# Patient Record
Sex: FEMALE | Race: WHITE | NOT HISPANIC OR LATINO
[De-identification: names, ages, dates, MRNs, and addresses within clinical notes are randomized per-mention and may not be internally consistent; named-entity substitution may affect disease eponyms.]

---

## 2020-08-31 ENCOUNTER — TRANSCRIPTION ENCOUNTER (OUTPATIENT)
Age: 55
End: 2020-08-31

## 2020-10-04 ENCOUNTER — TRANSCRIPTION ENCOUNTER (OUTPATIENT)
Age: 55
End: 2020-10-04

## 2021-07-28 ENCOUNTER — OUTPATIENT (OUTPATIENT)
Dept: OUTPATIENT SERVICES | Facility: HOSPITAL | Age: 56
LOS: 1 days | Discharge: HOME | End: 2021-07-28
Payer: COMMERCIAL

## 2021-07-28 VITALS
TEMPERATURE: 98 F | SYSTOLIC BLOOD PRESSURE: 98 MMHG | HEIGHT: 62.5 IN | DIASTOLIC BLOOD PRESSURE: 65 MMHG | HEART RATE: 67 BPM | WEIGHT: 200.4 LBS | RESPIRATION RATE: 17 BRPM | OXYGEN SATURATION: 97 %

## 2021-07-28 DIAGNOSIS — Z01.818 ENCOUNTER FOR OTHER PREPROCEDURAL EXAMINATION: ICD-10-CM

## 2021-07-28 DIAGNOSIS — Z98.84 BARIATRIC SURGERY STATUS: Chronic | ICD-10-CM

## 2021-07-28 DIAGNOSIS — M16.12 UNILATERAL PRIMARY OSTEOARTHRITIS, LEFT HIP: ICD-10-CM

## 2021-07-28 DIAGNOSIS — Z96.651 PRESENCE OF RIGHT ARTIFICIAL KNEE JOINT: Chronic | ICD-10-CM

## 2021-07-28 DIAGNOSIS — Z90.49 ACQUIRED ABSENCE OF OTHER SPECIFIED PARTS OF DIGESTIVE TRACT: Chronic | ICD-10-CM

## 2021-07-28 LAB
A1C WITH ESTIMATED AVERAGE GLUCOSE RESULT: 5.6 % — SIGNIFICANT CHANGE UP (ref 4–5.6)
ANION GAP SERPL CALC-SCNC: 5 MMOL/L — SIGNIFICANT CHANGE UP (ref 5–17)
APTT BLD: 34.9 SEC — SIGNIFICANT CHANGE UP (ref 27.5–35.5)
BLD GP AB SCN SERPL QL: SIGNIFICANT CHANGE UP
BUN SERPL-MCNC: 18 MG/DL — SIGNIFICANT CHANGE UP (ref 7–23)
CALCIUM SERPL-MCNC: 9 MG/DL — SIGNIFICANT CHANGE UP (ref 8.5–10.1)
CHLORIDE SERPL-SCNC: 105 MMOL/L — SIGNIFICANT CHANGE UP (ref 96–108)
CO2 SERPL-SCNC: 30 MMOL/L — SIGNIFICANT CHANGE UP (ref 22–31)
CREAT SERPL-MCNC: 0.77 MG/DL — SIGNIFICANT CHANGE UP (ref 0.5–1.3)
ESTIMATED AVERAGE GLUCOSE: 114 MG/DL — SIGNIFICANT CHANGE UP (ref 68–114)
GLUCOSE SERPL-MCNC: 88 MG/DL — SIGNIFICANT CHANGE UP (ref 70–99)
HCG UR QL: NEGATIVE — SIGNIFICANT CHANGE UP
HCT VFR BLD CALC: 41.8 % — SIGNIFICANT CHANGE UP (ref 34.5–45)
HGB BLD-MCNC: 13.5 G/DL — SIGNIFICANT CHANGE UP (ref 11.5–15.5)
INR BLD: 1.1 RATIO — SIGNIFICANT CHANGE UP (ref 0.88–1.16)
MCHC RBC-ENTMCNC: 28.5 PG — SIGNIFICANT CHANGE UP (ref 27–34)
MCHC RBC-ENTMCNC: 32.3 GM/DL — SIGNIFICANT CHANGE UP (ref 32–36)
MCV RBC AUTO: 88.2 FL — SIGNIFICANT CHANGE UP (ref 80–100)
MRSA PCR RESULT.: SIGNIFICANT CHANGE UP
NRBC # BLD: 0 /100 WBCS — SIGNIFICANT CHANGE UP (ref 0–0)
PLATELET # BLD AUTO: 246 K/UL — SIGNIFICANT CHANGE UP (ref 150–400)
POTASSIUM SERPL-MCNC: 3.5 MMOL/L — SIGNIFICANT CHANGE UP (ref 3.5–5.3)
POTASSIUM SERPL-SCNC: 3.5 MMOL/L — SIGNIFICANT CHANGE UP (ref 3.5–5.3)
PROTHROM AB SERPL-ACNC: 12.7 SEC — SIGNIFICANT CHANGE UP (ref 10.6–13.6)
RBC # BLD: 4.74 M/UL — SIGNIFICANT CHANGE UP (ref 3.8–5.2)
RBC # FLD: 13.3 % — SIGNIFICANT CHANGE UP (ref 10.3–14.5)
S AUREUS DNA NOSE QL NAA+PROBE: SIGNIFICANT CHANGE UP
SODIUM SERPL-SCNC: 140 MMOL/L — SIGNIFICANT CHANGE UP (ref 135–145)
WBC # BLD: 5.71 K/UL — SIGNIFICANT CHANGE UP (ref 3.8–10.5)
WBC # FLD AUTO: 5.71 K/UL — SIGNIFICANT CHANGE UP (ref 3.8–10.5)

## 2021-07-28 PROCEDURE — 93010 ELECTROCARDIOGRAM REPORT: CPT

## 2021-07-28 RX ORDER — AMLODIPINE BESYLATE 2.5 MG/1
1 TABLET ORAL
Qty: 0 | Refills: 0 | DISCHARGE

## 2021-07-28 NOTE — OCCUPATIONAL THERAPY INITIAL EVALUATION ADULT - ADDITIONAL COMMENTS
Pt lives alone in a condo (family/friends able to assist post-operatively) c elevator to enter. Once inside, pt's main bedroom/bathroom is on one level. Pt's bathroom is equipped c a tub/shower-combo, fixed shower head, and standard height toilet seat. Pt reports there is adequate space over toilet to fit 3:1 commode. Pt is right hand dominant, wears glasses for reading and currently drives. Pt reports daily pain at rest is 4-5/10 and increases to 10+/10 c ambulation and stair climbing. Pt alleviates pain c elevation, heat, Ibuprofen and celebrex PRN. Pt had gastric sleeve surgery May 2021 and reported precautions c NSAIDS at this time.

## 2021-07-28 NOTE — OCCUPATIONAL THERAPY INITIAL EVALUATION ADULT - GENERAL OBSERVATIONS, REHAB EVAL
Pt is a 56 y/o female slated for elective surgery for left PARISH with MD Lal on 8/11/21 due to OA and chronic pain. Pt denied any falls in the past 3-6 months. Pt reported buckling of LLE 3x/week.

## 2021-07-28 NOTE — H&P PST ADULT - ASSESSMENT
55 year old female with a past medical history of HTN and GERD c/o severe pain and limited ROM to left hip secondary to osteoarthritis  She is scheduled for a left total hip replacement.      CAPRINI SCORE [CLOT]    AGE RELATED RISK FACTORS                                                       MOBILITY RELATED FACTORS  [x] Age 41-60 years                                            (1 Point)                  [ ] Bed rest                                                        (1 Point)  [ ] Age: 61-74 years                                           (2 Points)                 [ ] Plaster cast                                                   (2 Points)  [ ] Age= 75 years                                              (3 Points)                 [ ] Bed bound for more than 72 hours                 (2 Points)    DISEASE RELATED RISK FACTORS                                               GENDER SPECIFIC FACTORS  [ ] Edema in the lower extremities                       (1 Point)                  [ ] Pregnancy                                                     (1 Point)  [ ] Varicose veins                                               (1 Point)                  [ ] Post-partum < 6 weeks                                   (1 Point)             [ x] BMI > 25 Kg/m2                                            (1 Point)                  [ ] Hormonal therapy  or oral contraception          (1 Point)                 [ ] Sepsis (in the previous month)                        (1 Point)                  [ ] History of pregnancy complications                 (1 point)  [ ] Pneumonia or serious lung disease                                               [ ] Unexplained or recurrent                     (1 Point)           (in the previous month)                               (1 Point)  [ ] Abnormal pulmonary function test                     (1 Point)                 SURGERY RELATED RISK FACTORS  [ ] Acute myocardial infarction                              (1 Point)                 [ ]  Section                                             (1 Point)  [ ] Congestive heart failure (in the previous month)  (1 Point)               [ ] Minor surgery                                                  (1 Point)   [ ] Inflammatory bowel disease                             (1 Point)                 [ ] Arthroscopic surgery                                        (2 Points)  [ ] Central venous access                                      (2 Points)                [ ] General surgery lasting more than 45 minutes   (2 Points)       [ ] Stroke (in the previous month)                          (5 Points)               [ x] Elective arthroplasty                                         (5 Points)                                                                                                                                               HEMATOLOGY RELATED FACTORS                                                 TRAUMA RELATED RISK FACTORS  [ ] Prior episodes of VTE                                     (3 Points)                [ ] Fracture of the hip, pelvis, or leg                       (5 Points)  [ ] Positive family history for VTE                         (3 Points)                 [ ] Acute spinal cord injury (in the previous month)  (5 Points)  [ ] Prothrombin 81123 A                                     (3 Points)                 [ ] Paralysis  (less than 1 month)                             (5 Points)  [ ] Factor V Leiden                                             (3 Points)                  [ ] Multiple Trauma within 1 month                        (5 Points)  [ ] Lupus anticoagulants                                     (3 Points)                                                           [ ] Anticardiolipin antibodies                               (3 Points)                                                       [ ] High homocysteine in the blood                      (3 Points)                                             [ ] Other congenital or acquired thrombophilia      (3 Points)                                                [ ] Heparin induced thrombocytopenia                  (3 Points)                                          Total Score [ 7         ]    Caprini Score 0 - 2:  Low Risk, No VTE Prophylaxis required for most patients, encourage ambulation  Caprini Score 3 - 6:  At Risk, pharmacologic VTE prophylaxis is indicated for most patients (in the absence of a contraindication)  Caprini Score Greater than or = 7:  High Risk, pharmacologic VTE prophylaxis is indicated for most patients (in the absence of a contraindication)    Caprini score indicates that the patient is high risk for VTE event ( score 6 or greater). Surgical patient's in this group will benefit from both pharmacologic prophylaxis and intermittent compression devices . Surgical team will determine the balance between VTE  risk and bleeding risk and other clinical considerations

## 2021-07-28 NOTE — PHYSICAL THERAPY INITIAL EVALUATION ADULT - ADDITIONAL COMMENTS
Pt reporting that she lives in a condo, no steps to negotiate outside or inside. Bathroom is a tub shower, no grab bars, fixed shower head, standard height toilet. Pt reporting that a 3:1 commode can fit over toilet. Pt has a cane, used prn, no other dme. Pain is 4/10 at rest and can increase to 10/10 with activity. Pt uses heat abd elevation for pain relief, meds like advil and celebrex. Pt reports that she has had adverse reaction to NSAIDs, no recent PT, no recent falls but experiences buckling 2-3 x week. Pt wears reading glasses, is R hand dominant, drives, no hearing impairments. Pt currently working as a teacher.

## 2021-07-28 NOTE — PHYSICAL THERAPY INITIAL EVALUATION ADULT - SINGLE LEG BALANCE TEST, REHAB EVAL
One Leg Stand Test (OLST): Right: 10 seconds Left: 0 seconds. Functional test to assess fall risk. Both gait and stair negotiation require components of OLST. Participants unable to perform the OLST for at least 5 seconds are at increased risk for injurious fall.

## 2021-07-28 NOTE — H&P PST ADULT - HISTORY OF PRESENT ILLNESS
55 year old female with a past medical history of HTN and GERD c/o severe pain and limited ROM to left hip secondary to osteoarthritis  She is scheduled for a left total hip replacement.      She denies fever, cough, SOB, recent travels, and sick contacts.    Goal: To exercise and walk without pain.

## 2021-07-28 NOTE — H&P PST ADULT - NSANTHOSAYNRD_GEN_A_CORE
no longer use since weight loss/No. VERNA screening performed.  STOP BANG Legend: 0-2 = LOW Risk; 3-4 = INTERMEDIATE Risk; 5-8 = HIGH Risk

## 2021-07-28 NOTE — PHYSICAL THERAPY INITIAL EVALUATION ADULT - MODIFIED CLINICAL TEST OF SENSORY INTEGRATION IN BALANCE TEST
30 second Sit to Stand Test: (reps: 7 adaptation: B UE used) Functional assessment of lower extremity strength and ability to maintain balance in standing, discriminates those with low and high levels of functional activity.

## 2021-07-28 NOTE — H&P PST ADULT - NSICDXPROBLEM_GEN_ALL_CORE_FT
PROBLEM DIAGNOSES  Problem: Unilateral primary osteoarthritis, left hip  Assessment and Plan: left total hiip replacement     Problem: Preop examination  Assessment and Plan: labs - cbc,pt/ptt,bmp,t&s,nose cx,ekg  M/C required  preop 3 day hibiclens instruction reviewed and given .instructed on if  nose cx positive use mupuricin 5 days and checklist given  take routine meds DOS with sips of water. avoid NSAID and OTC supplements. verbalized understanding  information on proper nutrition , increase protein and better food choices provided in packet

## 2021-07-28 NOTE — H&P PST ADULT - NSICDXPASTSURGICALHX_GEN_ALL_CORE_FT
PAST SURGICAL HISTORY:  H/O bariatric surgery     H/O total knee replacement, right     History of appendectomy

## 2021-08-08 RX ORDER — ACETAMINOPHEN 500 MG
1000 TABLET ORAL ONCE
Refills: 0 | Status: DISCONTINUED | OUTPATIENT
Start: 2021-08-11 | End: 2021-08-12

## 2021-08-08 RX ORDER — OXYCODONE HYDROCHLORIDE 5 MG/1
10 TABLET ORAL EVERY 4 HOURS
Refills: 0 | Status: DISCONTINUED | OUTPATIENT
Start: 2021-08-11 | End: 2021-08-12

## 2021-08-08 RX ORDER — AMLODIPINE BESYLATE 2.5 MG/1
10 TABLET ORAL DAILY
Refills: 0 | Status: DISCONTINUED | OUTPATIENT
Start: 2021-08-11 | End: 2021-08-12

## 2021-08-08 RX ORDER — CELECOXIB 200 MG/1
200 CAPSULE ORAL EVERY 12 HOURS
Refills: 0 | Status: DISCONTINUED | OUTPATIENT
Start: 2021-08-12 | End: 2021-08-12

## 2021-08-08 RX ORDER — ONDANSETRON 8 MG/1
4 TABLET, FILM COATED ORAL EVERY 6 HOURS
Refills: 0 | Status: DISCONTINUED | OUTPATIENT
Start: 2021-08-11 | End: 2021-08-12

## 2021-08-08 RX ORDER — POLYETHYLENE GLYCOL 3350 17 G/17G
17 POWDER, FOR SOLUTION ORAL AT BEDTIME
Refills: 0 | Status: DISCONTINUED | OUTPATIENT
Start: 2021-08-11 | End: 2021-08-12

## 2021-08-08 RX ORDER — PANTOPRAZOLE SODIUM 20 MG/1
40 TABLET, DELAYED RELEASE ORAL
Refills: 0 | Status: DISCONTINUED | OUTPATIENT
Start: 2021-08-11 | End: 2021-08-12

## 2021-08-08 RX ORDER — DEXAMETHASONE 0.5 MG/5ML
10 ELIXIR ORAL ONCE
Refills: 0 | Status: COMPLETED | OUTPATIENT
Start: 2021-08-12 | End: 2021-08-12

## 2021-08-08 RX ORDER — OXYCODONE HYDROCHLORIDE 5 MG/1
5 TABLET ORAL EVERY 4 HOURS
Refills: 0 | Status: DISCONTINUED | OUTPATIENT
Start: 2021-08-11 | End: 2021-08-12

## 2021-08-08 RX ORDER — ASCORBIC ACID 60 MG
500 TABLET,CHEWABLE ORAL
Refills: 0 | Status: DISCONTINUED | OUTPATIENT
Start: 2021-08-11 | End: 2021-08-12

## 2021-08-08 RX ORDER — HYDROCHLOROTHIAZIDE 25 MG
12.5 TABLET ORAL DAILY
Refills: 0 | Status: DISCONTINUED | OUTPATIENT
Start: 2021-08-11 | End: 2021-08-12

## 2021-08-08 RX ORDER — ASPIRIN/CALCIUM CARB/MAGNESIUM 324 MG
81 TABLET ORAL
Refills: 0 | Status: DISCONTINUED | OUTPATIENT
Start: 2021-08-12 | End: 2021-08-12

## 2021-08-08 RX ORDER — LISINOPRIL 2.5 MG/1
40 TABLET ORAL DAILY
Refills: 0 | Status: DISCONTINUED | OUTPATIENT
Start: 2021-08-11 | End: 2021-08-12

## 2021-08-08 RX ORDER — SENNA PLUS 8.6 MG/1
2 TABLET ORAL AT BEDTIME
Refills: 0 | Status: DISCONTINUED | OUTPATIENT
Start: 2021-08-11 | End: 2021-08-12

## 2021-08-08 RX ORDER — SODIUM CHLORIDE 9 MG/ML
1000 INJECTION, SOLUTION INTRAVENOUS
Refills: 0 | Status: DISCONTINUED | OUTPATIENT
Start: 2021-08-11 | End: 2021-08-12

## 2021-08-10 ENCOUNTER — TRANSCRIPTION ENCOUNTER (OUTPATIENT)
Age: 56
End: 2021-08-10

## 2021-08-10 RX ORDER — HYDROMORPHONE HYDROCHLORIDE 2 MG/ML
0.5 INJECTION INTRAMUSCULAR; INTRAVENOUS; SUBCUTANEOUS
Refills: 0 | Status: DISCONTINUED | OUTPATIENT
Start: 2021-08-11 | End: 2021-08-12

## 2021-08-11 ENCOUNTER — INPATIENT (INPATIENT)
Facility: HOSPITAL | Age: 56
LOS: 0 days | Discharge: HOME HEALTH | End: 2021-08-12
Attending: ORTHOPAEDIC SURGERY | Admitting: ORTHOPAEDIC SURGERY
Payer: COMMERCIAL

## 2021-08-11 ENCOUNTER — RESULT REVIEW (OUTPATIENT)
Age: 56
End: 2021-08-11

## 2021-08-11 ENCOUNTER — TRANSCRIPTION ENCOUNTER (OUTPATIENT)
Age: 56
End: 2021-08-11

## 2021-08-11 VITALS
DIASTOLIC BLOOD PRESSURE: 84 MMHG | RESPIRATION RATE: 18 BRPM | HEART RATE: 73 BPM | OXYGEN SATURATION: 98 % | TEMPERATURE: 99 F | HEIGHT: 62 IN | WEIGHT: 197.98 LBS | SYSTOLIC BLOOD PRESSURE: 132 MMHG

## 2021-08-11 DIAGNOSIS — Z96.651 PRESENCE OF RIGHT ARTIFICIAL KNEE JOINT: Chronic | ICD-10-CM

## 2021-08-11 DIAGNOSIS — Z90.49 ACQUIRED ABSENCE OF OTHER SPECIFIED PARTS OF DIGESTIVE TRACT: Chronic | ICD-10-CM

## 2021-08-11 DIAGNOSIS — Z98.84 BARIATRIC SURGERY STATUS: Chronic | ICD-10-CM

## 2021-08-11 LAB
BLD GP AB SCN SERPL QL: SIGNIFICANT CHANGE UP
HCG UR QL: NEGATIVE — SIGNIFICANT CHANGE UP
HCT VFR BLD CALC: 37.5 % — SIGNIFICANT CHANGE UP (ref 34.5–45)
HGB BLD-MCNC: 11.8 G/DL — SIGNIFICANT CHANGE UP (ref 11.5–15.5)
MCHC RBC-ENTMCNC: 28.4 PG — SIGNIFICANT CHANGE UP (ref 27–34)
MCHC RBC-ENTMCNC: 31.5 GM/DL — LOW (ref 32–36)
MCV RBC AUTO: 90.4 FL — SIGNIFICANT CHANGE UP (ref 80–100)
NRBC # BLD: 0 /100 WBCS — SIGNIFICANT CHANGE UP (ref 0–0)
PLATELET # BLD AUTO: 164 K/UL — SIGNIFICANT CHANGE UP (ref 150–400)
RBC # BLD: 4.15 M/UL — SIGNIFICANT CHANGE UP (ref 3.8–5.2)
RBC # FLD: 13.8 % — SIGNIFICANT CHANGE UP (ref 10.3–14.5)
WBC # BLD: 9.66 K/UL — SIGNIFICANT CHANGE UP (ref 3.8–10.5)
WBC # FLD AUTO: 9.66 K/UL — SIGNIFICANT CHANGE UP (ref 3.8–10.5)

## 2021-08-11 PROCEDURE — 72170 X-RAY EXAM OF PELVIS: CPT | Mod: 26

## 2021-08-11 PROCEDURE — 88311 DECALCIFY TISSUE: CPT | Mod: 26

## 2021-08-11 PROCEDURE — 88305 TISSUE EXAM BY PATHOLOGIST: CPT | Mod: 26

## 2021-08-11 RX ORDER — SODIUM CHLORIDE 9 MG/ML
1000 INJECTION, SOLUTION INTRAVENOUS
Refills: 0 | Status: DISCONTINUED | OUTPATIENT
Start: 2021-08-11 | End: 2021-08-11

## 2021-08-11 RX ORDER — SODIUM CHLORIDE 9 MG/ML
3 INJECTION INTRAMUSCULAR; INTRAVENOUS; SUBCUTANEOUS EVERY 8 HOURS
Refills: 0 | Status: DISCONTINUED | OUTPATIENT
Start: 2021-08-11 | End: 2021-08-11

## 2021-08-11 RX ORDER — OMEPRAZOLE 10 MG/1
1 CAPSULE, DELAYED RELEASE ORAL
Qty: 0 | Refills: 0 | DISCHARGE

## 2021-08-11 RX ORDER — AMLODIPINE BESYLATE AND BENAZEPRIL HYDROCHLORIDE 10; 20 MG/1; MG/1
1 CAPSULE ORAL
Qty: 0 | Refills: 0 | DISCHARGE

## 2021-08-11 RX ORDER — BENZOCAINE AND MENTHOL 5; 1 G/100ML; G/100ML
1 LIQUID ORAL THREE TIMES A DAY
Refills: 0 | Status: DISCONTINUED | OUTPATIENT
Start: 2021-08-11 | End: 2021-08-12

## 2021-08-11 RX ORDER — FENTANYL CITRATE 50 UG/ML
25 INJECTION INTRAVENOUS
Refills: 0 | Status: DISCONTINUED | OUTPATIENT
Start: 2021-08-11 | End: 2021-08-11

## 2021-08-11 RX ORDER — METOCLOPRAMIDE HCL 10 MG
10 TABLET ORAL ONCE
Refills: 0 | Status: DISCONTINUED | OUTPATIENT
Start: 2021-08-11 | End: 2021-08-11

## 2021-08-11 RX ORDER — LANOLIN ALCOHOL/MO/W.PET/CERES
3 CREAM (GRAM) TOPICAL AT BEDTIME
Refills: 0 | Status: DISCONTINUED | OUTPATIENT
Start: 2021-08-11 | End: 2021-08-12

## 2021-08-11 RX ORDER — FENTANYL CITRATE 50 UG/ML
50 INJECTION INTRAVENOUS
Refills: 0 | Status: DISCONTINUED | OUTPATIENT
Start: 2021-08-11 | End: 2021-08-11

## 2021-08-11 RX ORDER — ACETAMINOPHEN 500 MG
650 TABLET ORAL ONCE
Refills: 0 | Status: COMPLETED | OUTPATIENT
Start: 2021-08-11 | End: 2021-08-11

## 2021-08-11 RX ORDER — CEFAZOLIN SODIUM 1 G
2000 VIAL (EA) INJECTION EVERY 8 HOURS
Refills: 0 | Status: COMPLETED | OUTPATIENT
Start: 2021-08-11 | End: 2021-08-11

## 2021-08-11 RX ADMIN — OXYCODONE HYDROCHLORIDE 10 MILLIGRAM(S): 5 TABLET ORAL at 12:58

## 2021-08-11 RX ADMIN — Medication 3 MILLIGRAM(S): at 22:05

## 2021-08-11 RX ADMIN — OXYCODONE HYDROCHLORIDE 10 MILLIGRAM(S): 5 TABLET ORAL at 18:57

## 2021-08-11 RX ADMIN — Medication 100 MILLIGRAM(S): at 23:05

## 2021-08-11 RX ADMIN — SODIUM CHLORIDE 125 MILLILITER(S): 9 INJECTION, SOLUTION INTRAVENOUS at 10:56

## 2021-08-11 RX ADMIN — OXYCODONE HYDROCHLORIDE 10 MILLIGRAM(S): 5 TABLET ORAL at 13:50

## 2021-08-11 RX ADMIN — HYDROMORPHONE HYDROCHLORIDE 0.5 MILLIGRAM(S): 2 INJECTION INTRAMUSCULAR; INTRAVENOUS; SUBCUTANEOUS at 16:44

## 2021-08-11 RX ADMIN — SODIUM CHLORIDE 150 MILLILITER(S): 9 INJECTION, SOLUTION INTRAVENOUS at 12:58

## 2021-08-11 RX ADMIN — Medication 500 MILLIGRAM(S): at 18:05

## 2021-08-11 RX ADMIN — SODIUM CHLORIDE 150 MILLILITER(S): 9 INJECTION, SOLUTION INTRAVENOUS at 22:05

## 2021-08-11 RX ADMIN — Medication 1 TABLET(S): at 12:58

## 2021-08-11 RX ADMIN — OXYCODONE HYDROCHLORIDE 10 MILLIGRAM(S): 5 TABLET ORAL at 23:08

## 2021-08-11 RX ADMIN — Medication 100 MILLIGRAM(S): at 16:18

## 2021-08-11 RX ADMIN — POLYETHYLENE GLYCOL 3350 17 GRAM(S): 17 POWDER, FOR SOLUTION ORAL at 22:05

## 2021-08-11 RX ADMIN — SODIUM CHLORIDE 3 MILLILITER(S): 9 INJECTION INTRAMUSCULAR; INTRAVENOUS; SUBCUTANEOUS at 07:12

## 2021-08-11 RX ADMIN — Medication 650 MILLIGRAM(S): at 07:17

## 2021-08-11 RX ADMIN — OXYCODONE HYDROCHLORIDE 10 MILLIGRAM(S): 5 TABLET ORAL at 19:30

## 2021-08-11 RX ADMIN — HYDROMORPHONE HYDROCHLORIDE 0.5 MILLIGRAM(S): 2 INJECTION INTRAMUSCULAR; INTRAVENOUS; SUBCUTANEOUS at 16:58

## 2021-08-11 RX ADMIN — SENNA PLUS 2 TABLET(S): 8.6 TABLET ORAL at 22:05

## 2021-08-11 NOTE — DISCHARGE NOTE PROVIDER - NSDCFUADDAPPT_GEN_ALL_CORE_FT
Follow up with your surgeon in two weeks. Call for appointment.    If you need more pain medications, call your surgeon's office. For medication refills or authorizations call [phone]    Call and schedule a follow up appointment with your primary care physician for repeat blood work (CBC and BMP) for post hospital discharge follow-up care.    Call your surgeon if you have increased redness/pain/drainage or fever. Return to ER for shortness of breath/calf tenderness.

## 2021-08-11 NOTE — PHYSICAL THERAPY INITIAL EVALUATION ADULT - ASSISTIVE DEVICE FOR STAIR TRANSFER, REHAB EVAL
3 steps with L HR & sac ; 3 steps with RHR & SAC/straight cane/right rail up/left rail up/right rail down/left rail down

## 2021-08-11 NOTE — DISCHARGE NOTE PROVIDER - HOSPITAL COURSE
55yFemale with history of OA presenting for L PARISH by Dr. Lal on 8/11/2021. Risk and benefits of surgery were explained to the patient. The patient understood and agreed to proceed with surgery. Patient underwent the procedure with no intraoperative complications. Pt was brought in stable condition to the PACU. Once stable in PACU, pt was brought to the floor. During hospital stay pt was followed by Medicine, physical therapy, Home Care during this admission. Pt had an uneventful hospital course. Pt is stable for discharge to home 55yFemale with history of OA presenting for L PARISH by Dr. Lal on 8/11/2021. Risk and benefits of surgery were explained to the patient. The patient understood and agreed to proceed with surgery. Patient underwent the procedure with no intraoperative complications. Pt was brought in stable condition to the PACU. Once stable in PACU, pt was brought to the floor. Post-op CPAP and monitoring of pulse-ox and end-tidal C02. During hospital stay pt was followed by Medicine, physical therapy, Home Care during this admission. Pt had an uneventful hospital course. Pt is stable for discharge to home 55yFemale with history of OA presenting for L PARISH by Dr. Lal on 8/11/2021. Risk and benefits of surgery were explained to the patient. The patient understood and agreed to proceed with surgery. Patient underwent the procedure with no intraoperative complications. Pt was brought in stable condition to the PACU. Once stable in PACU, pt was brought to the floor. Post-op CPAP and monitoring of pulse-ox and end-tidal C02. During hospital stay pt was followed by Medicine, physical therapy, Home Care during this admission. Pt had an uneventful hospital course. Pt is stable for discharge to home on POD#1.

## 2021-08-11 NOTE — DISCHARGE NOTE PROVIDER - NSDCMRMEDTOKEN_GEN_ALL_CORE_FT
amlodipine-benazepril 10 mg-40 mg oral capsule: 1 cap(s) orally once a day  hydroCHLOROthiazide 12.5 mg oral capsule: 1 cap(s) orally once a day  omeprazole 40 mg oral delayed release capsule: 1  orally every other day   amlodipine-benazepril 10 mg-40 mg oral capsule: 1 cap(s) orally once a day  ascorbic acid 500 mg oral tablet: 1 tab(s) orally 2 times a day  Aspirin Enteric Coated 81 mg oral delayed release tablet: 1 tab(s) orally 2 times a day MDD:2  celecoxib 200 mg oral capsule: 1 cap(s) orally every 12 hours MDD:2  hydroCHLOROthiazide 12.5 mg oral capsule: 1 cap(s) orally once a day  Multiple Vitamins oral tablet: 1 tab(s) orally once a day  omeprazole 40 mg oral delayed release capsule: 1  orally every other day  oxyCODONE 5 mg oral tablet: 1- 2 tab(s) orally every 4 hours, As Needed -Pain MDD:10  polyethylene glycol 3350 oral powder for reconstitution: 17 gram(s) orally once a day (at bedtime)  senna oral tablet: 2 tab(s) orally once a day (at bedtime)  Tylenol 325 mg oral tablet: 2 tab(s) orally every 4 hours

## 2021-08-11 NOTE — PHYSICAL THERAPY INITIAL EVALUATION ADULT - GAIT TRAINING, PT EVAL
Pt will be able to ambulate using assistive device up to 200 ft or more, be able to negotiate 12 steps safely observing proper gait, posture and prevent falls.

## 2021-08-11 NOTE — CONSULT NOTE ADULT - SUBJECTIVE AND OBJECTIVE BOX
JEFF MATUTE is a 55y Female s/p LEFT TOTAL HIP REPLACEMENT      w/ h/o HTN (hypertension)    GERD (gastroesophageal reflux disease)      denies any chest pain shortness of breath palpitation dizziness lightheadedness nausea vomiting fever or chills    H/O total knee replacement, right    H/O bariatric surgery    History of appendectomy        SH: doesnot smoke or drink at this time    No Known Allergies    acetaminophen  IVPB .. 1000 milliGRAM(s) IV Intermittent once  amLODIPine   Tablet 10 milliGRAM(s) Oral daily  ascorbic acid 500 milliGRAM(s) Oral two times a day  ceFAZolin   IVPB 2000 milliGRAM(s) IV Intermittent every 8 hours  hydrochlorothiazide 12.5 milliGRAM(s) Oral daily  HYDROmorphone  Injectable 0.5 milliGRAM(s) IV Push every 3 hours PRN  lactated ringers. 1000 milliLiter(s) IV Continuous <Continuous>  lisinopril 40 milliGRAM(s) Oral daily  multivitamin 1 Tablet(s) Oral daily  ondansetron Injectable 4 milliGRAM(s) IV Push every 6 hours PRN  oxyCODONE    IR 5 milliGRAM(s) Oral every 4 hours PRN  oxyCODONE    IR 10 milliGRAM(s) Oral every 4 hours PRN  pantoprazole    Tablet 40 milliGRAM(s) Oral before breakfast  polyethylene glycol 3350 17 Gram(s) Oral at bedtime  senna 2 Tablet(s) Oral at bedtime    T(C): 37.1 (08-11-21 @ 15:35), Max: 37.1 (08-11-21 @ 06:17)  HR: 85 (08-11-21 @ 16:20) (46 - 85)  BP: 134/84 (08-11-21 @ 15:35) (116/75 - 149/78)  RR: 17 (08-11-21 @ 15:35) (13 - 19)  SpO2: 97% (08-11-21 @ 16:20) (95% - 100%)  HEENT unremarkable  neck no JVD or bruit  heart normal S1 S2 RRR no gallops or rubs  chest clear to auscultation  abd sof nontender non distended +bs  ext no calf tenderness    A/P   DVT PX  pain control  bowel regimen   wound care as per ortho  GI PX  antiemetics prn  incentive spirometer

## 2021-08-11 NOTE — DISCHARGE NOTE PROVIDER - CARE PROVIDER_API CALL
Mc Lal)  Orthopaedic Surgery  [address] [place], [place] [address]  Phone: [phone]  Fax: [phone]  Follow Up Time:

## 2021-08-11 NOTE — PHYSICAL THERAPY INITIAL EVALUATION ADULT - GENERAL OBSERVATIONS, REHAB EVAL
Patient received supine NAD. A x O 4. +SAMANTHA dressing  Able to follow 2 step commands. YOBANY Guthrie disconnected IV prior to PT session & premedicated for pain.

## 2021-08-11 NOTE — PHYSICAL THERAPY INITIAL EVALUATION ADULT - ACTIVE RANGE OF MOTION EXAMINATION, REHAB EVAL
except L hip with post hip precautions/bilateral upper extremity Active ROM was WFL (within functional limits)/bilateral  lower extremity Active ROM was WFL (within functional limits)

## 2021-08-11 NOTE — DISCHARGE NOTE NURSING/CASE MANAGEMENT/SOCIAL WORK - NSDCFUADDAPPT_GEN_ALL_CORE_FT
Follow up with your surgeon in two weeks. Call for appointment.    If you need more pain medications, call your surgeon's office. For medication refills or authorizations call [phone]    Call and schedule a follow up appointment with your primary care physician for repeat blood work (CBC and BMP) for post hospital discharge follow-up care.    Call your surgeon if you have increased redness/pain/drainage or fever. Return to ER for shortness of breath/calf tenderness.

## 2021-08-11 NOTE — DISCHARGE NOTE PROVIDER - NSDCFUADDINST_GEN_ALL_CORE_FT
Keep SAMANTHA Dressing Clean, Dry and Intact. May shower with SAMANTHA Dressing. Please do not scrub, soak, peel or pick at the SAMANTHA dressing. No creams, lotions, or oils over dressing. May shower and let water run over dressing, no baths. Pat dry once out of shower. Dressing to be removed in 7 days (8/17/2021). If dressing is saturated from border to border - may remove and replace with clean dry dressing.    Shower instructions for SAMANTHA Dressing: Turn battery pack off. Twist OFF battery pack before entering shower. Once done with showering. Pat dressing dry. Reconnect and twist ON battery pack after you are dry. Then turn battery pack on.     As per Dr. Lal:  Patient does not need abduction pillow at home.  Patient can sleep with regular pillow between their knees.  Patient can sleep up on non operative side.    Hip replacement precautions: Abduction pillow. Elevate the leg (while keeping hip precautions) as often as possible to help control swelling.

## 2021-08-11 NOTE — OCCUPATIONAL THERAPY INITIAL EVALUATION ADULT - ADDITIONAL COMMENTS
Pre op assessment - Pt lives alone in a condo (family/friends able to assist post-operatively) c elevator to enter. Once inside, pt's main bedroom/bathroom is on one level. Pt's bathroom is equipped c a tub/shower-combo, fixed shower head, and standard height toilet seat.

## 2021-08-11 NOTE — DISCHARGE NOTE NURSING/CASE MANAGEMENT/SOCIAL WORK - PATIENT PORTAL LINK FT
You can access the FollowMyHealth Patient Portal offered by [facility] by registering at the following website: [email or web address]. By joining [facility]’s FollowMyHealth portal, you will also be able to view your health information using other applications (apps) compatible with our system.

## 2021-08-12 VITALS
OXYGEN SATURATION: 97 % | HEART RATE: 69 BPM | SYSTOLIC BLOOD PRESSURE: 101 MMHG | DIASTOLIC BLOOD PRESSURE: 57 MMHG | TEMPERATURE: 99 F | RESPIRATION RATE: 18 BRPM

## 2021-08-12 LAB
ANION GAP SERPL CALC-SCNC: 6 MMOL/L — SIGNIFICANT CHANGE UP (ref 5–17)
BUN SERPL-MCNC: 14 MG/DL — SIGNIFICANT CHANGE UP (ref 7–23)
CALCIUM SERPL-MCNC: 8 MG/DL — LOW (ref 8.5–10.1)
CHLORIDE SERPL-SCNC: 106 MMOL/L — SIGNIFICANT CHANGE UP (ref 96–108)
CO2 SERPL-SCNC: 28 MMOL/L — SIGNIFICANT CHANGE UP (ref 22–31)
CREAT SERPL-MCNC: 0.61 MG/DL — SIGNIFICANT CHANGE UP (ref 0.5–1.3)
GLUCOSE SERPL-MCNC: 100 MG/DL — HIGH (ref 70–99)
HCT VFR BLD CALC: 31.5 % — LOW (ref 34.5–45)
HGB BLD-MCNC: 10.5 G/DL — LOW (ref 11.5–15.5)
MCHC RBC-ENTMCNC: 28.6 PG — SIGNIFICANT CHANGE UP (ref 27–34)
MCHC RBC-ENTMCNC: 33.3 GM/DL — SIGNIFICANT CHANGE UP (ref 32–36)
MCV RBC AUTO: 85.8 FL — SIGNIFICANT CHANGE UP (ref 80–100)
NRBC # BLD: 0 /100 WBCS — SIGNIFICANT CHANGE UP (ref 0–0)
PLATELET # BLD AUTO: 202 K/UL — SIGNIFICANT CHANGE UP (ref 150–400)
POTASSIUM SERPL-MCNC: 3.6 MMOL/L — SIGNIFICANT CHANGE UP (ref 3.5–5.3)
POTASSIUM SERPL-SCNC: 3.6 MMOL/L — SIGNIFICANT CHANGE UP (ref 3.5–5.3)
RBC # BLD: 3.67 M/UL — LOW (ref 3.8–5.2)
RBC # FLD: 13.9 % — SIGNIFICANT CHANGE UP (ref 10.3–14.5)
SODIUM SERPL-SCNC: 140 MMOL/L — SIGNIFICANT CHANGE UP (ref 135–145)
WBC # BLD: 10.74 K/UL — HIGH (ref 3.8–10.5)
WBC # FLD AUTO: 10.74 K/UL — HIGH (ref 3.8–10.5)

## 2021-08-12 RX ORDER — ASPIRIN/CALCIUM CARB/MAGNESIUM 324 MG
1 TABLET ORAL
Qty: 60 | Refills: 0
Start: 2021-08-12 | End: 2021-09-10

## 2021-08-12 RX ORDER — CELECOXIB 200 MG/1
1 CAPSULE ORAL
Qty: 60 | Refills: 0
Start: 2021-08-12 | End: 2021-09-10

## 2021-08-12 RX ORDER — ACETAMINOPHEN 500 MG
2 TABLET ORAL
Qty: 0 | Refills: 0 | DISCHARGE

## 2021-08-12 RX ORDER — OXYCODONE HYDROCHLORIDE 5 MG/1
2 TABLET ORAL
Qty: 50 | Refills: 0
Start: 2021-08-12 | End: 2021-08-16

## 2021-08-12 RX ORDER — POLYETHYLENE GLYCOL 3350 17 G/17G
17 POWDER, FOR SOLUTION ORAL
Qty: 0 | Refills: 0 | DISCHARGE
Start: 2021-08-12

## 2021-08-12 RX ORDER — SENNA PLUS 8.6 MG/1
2 TABLET ORAL
Qty: 0 | Refills: 0 | DISCHARGE
Start: 2021-08-12

## 2021-08-12 RX ORDER — ASCORBIC ACID 60 MG
1 TABLET,CHEWABLE ORAL
Qty: 0 | Refills: 0 | DISCHARGE
Start: 2021-08-12

## 2021-08-12 RX ADMIN — AMLODIPINE BESYLATE 10 MILLIGRAM(S): 2.5 TABLET ORAL at 05:28

## 2021-08-12 RX ADMIN — OXYCODONE HYDROCHLORIDE 10 MILLIGRAM(S): 5 TABLET ORAL at 00:00

## 2021-08-12 RX ADMIN — CELECOXIB 200 MILLIGRAM(S): 200 CAPSULE ORAL at 05:28

## 2021-08-12 RX ADMIN — OXYCODONE HYDROCHLORIDE 10 MILLIGRAM(S): 5 TABLET ORAL at 03:38

## 2021-08-12 RX ADMIN — Medication 500 MILLIGRAM(S): at 05:28

## 2021-08-12 RX ADMIN — OXYCODONE HYDROCHLORIDE 10 MILLIGRAM(S): 5 TABLET ORAL at 11:29

## 2021-08-12 RX ADMIN — OXYCODONE HYDROCHLORIDE 10 MILLIGRAM(S): 5 TABLET ORAL at 04:30

## 2021-08-12 RX ADMIN — Medication 102 MILLIGRAM(S): at 05:28

## 2021-08-12 RX ADMIN — CELECOXIB 200 MILLIGRAM(S): 200 CAPSULE ORAL at 06:20

## 2021-08-12 RX ADMIN — OXYCODONE HYDROCHLORIDE 10 MILLIGRAM(S): 5 TABLET ORAL at 12:00

## 2021-08-12 RX ADMIN — LISINOPRIL 40 MILLIGRAM(S): 2.5 TABLET ORAL at 05:28

## 2021-08-12 RX ADMIN — Medication 1 TABLET(S): at 11:29

## 2021-08-12 RX ADMIN — Medication 81 MILLIGRAM(S): at 05:28

## 2021-08-12 RX ADMIN — PANTOPRAZOLE SODIUM 40 MILLIGRAM(S): 20 TABLET, DELAYED RELEASE ORAL at 05:28

## 2021-08-12 RX ADMIN — Medication 12.5 MILLIGRAM(S): at 05:28

## 2021-08-12 NOTE — PROGRESS NOTE ADULT - SUBJECTIVE AND OBJECTIVE BOX
JEFF MATUTE is a 55y Female s/p LEFT TOTAL HIP REPLACEMENT        denies any chest pain shortness of breath palpitation dizziness lightheadedness nausea vomiting fever or chills    T(C): 37.2 (08-12-21 @ 09:03), Max: 37.6 (08-12-21 @ 02:00)  HR: 74 (08-12-21 @ 09:25) (70 - 86)  BP: 107/69 (08-12-21 @ 09:25) (104/69 - 156/94)  RR: 18 (08-12-21 @ 09:25) (14 - 18)  SpO2: 97% (08-12-21 @ 09:25) (95% - 98%)  no jvd/bruit  s1 s2 rrr  cta  s/nt/nd  no calf tend                        10.5   10.74 )-----------( 202      ( 12 Aug 2021 06:21 )             31.5   08-12    140  |  106  |  14  ----------------------------<  100<H>  3.6   |  28  |  0.61    Ca    8.0<L>      12 Aug 2021 06:21        cont dvt px  pain control  bowel regimen  antiemetics  incentive spirometer
Patient is seen and examined at bedside. Denies CP/SOB/Dizziness/N/V/D/HA. Pain is controlled.     Vital Signs Last 24 Hrs  T(C): 37.2 (12 Aug 2021 09:03), Max: 37.6 (12 Aug 2021 02:00)  T(F): 98.9 (12 Aug 2021 09:03), Max: 99.6 (12 Aug 2021 02:00)  HR: 74 (12 Aug 2021 09:25) (46 - 86)  BP: 107/69 (12 Aug 2021 09:25) (104/69 - 156/94)  BP(mean): --  RR: 18 (12 Aug 2021 09:25) (14 - 19)  SpO2: 97% (12 Aug 2021 09:25) (95% - 100%)    GEN: NAD  ABD: soft, NT/ND; no rebound or guarding;  Neurologic: AAOx3; CNS grossly intact; no focal deficits  RLE: Motor intact + EHL/FHL/TA/GS. Sensation is grossly intact.  Extremities warm. . Compartments soft, compressible. No calf tenderness. DP 2+.  LLE: SAMANTHA dressing  Motor intact + EHL/FHL/TA/GS. Sensation is grossly intact. Extremities warm. Compartments soft, compressible. No calf tenderness.. DP 2+.    Labs:                          10.5   10.74 )-----------( 202      ( 12 Aug 2021 06:21 )             31.5       08-12    140  |  106  |  14  ----------------------------<  100<H>  3.6   |  28  |  0.61    Ca    8.0<L>      12 Aug 2021 06:21        A/P: Patient is a 55y y/o Female s/p left PARISH, POD # 1  -wound care, isometric exercises, GI motility, new medications, hip precautions,  hospital course and discharge planning reviewed with pt  -Pain control/analgesia  -Inc spirometry reviewed and counseled  -Venodynes/foot pumps  -PT/OT/WBAT  -Anticoagulation: asa 81mg BID  -DC plan: home today  -D/W Dr Lal  
Post op Note  Patient is s/p L PARISH under spinal anesthesia. Pt tolerated procedure well without any intra-op complications. Pt doing well at this time, pain controlled Denies CP/SOB/Dizziness/N/V/D/HA.      Vital Signs Last 24 Hrs  T(C): 36.5 (11 Aug 2021 10:37), Max: 37.1 (11 Aug 2021 06:17)  T(F): 97.7 (11 Aug 2021 10:37), Max: 98.8 (11 Aug 2021 06:17)  HR: 83 (11 Aug 2021 10:37) (46 - 83)  BP: 116/75 (11 Aug 2021 10:37) (116/75 - 138/70)  BP(mean): --  RR: 14 (11 Aug 2021 10:37) (13 - 18)  SpO2: 100% (11 Aug 2021 10:37) (98% - 100%)    GEN: NAD  LLE: Dressing C/D/I. abduction pillow in place  B/L LE's: Motor intact + EHL/FHL/TA/GS in the BL LE. Sensation is grossly intact B/L. Extremities warm B/L. Compartments soft, compressible B/L, no calf tenderness B/L. DP 2+ B/L.    Labs:                          11.8   9.66  )-----------( 164      ( 11 Aug 2021 09:56 )             37.5               A/P: Patient is a 55y y/o Female s/p L PARISH, POD #0  -wound care, isometric exercises, GI motility, new medications, hospital course and discharge planning reviewed with pt  -Pain control/analgesia  -Inc spirometry reviewed and counseled  -SCD's to B/L LE  -F/U AM Labs  -PT/OT/WBAT, Posterior hip precautions,   -Antibiotic 24hours post-op  -Anticoagulation: ASA BID

## 2021-08-13 LAB — SURGICAL PATHOLOGY STUDY: SIGNIFICANT CHANGE UP

## 2021-08-17 DIAGNOSIS — M16.12 UNILATERAL PRIMARY OSTEOARTHRITIS, LEFT HIP: ICD-10-CM

## 2021-08-17 DIAGNOSIS — K21.9 GASTRO-ESOPHAGEAL REFLUX DISEASE WITHOUT ESOPHAGITIS: ICD-10-CM

## 2021-08-17 DIAGNOSIS — I10 ESSENTIAL (PRIMARY) HYPERTENSION: ICD-10-CM

## 2022-07-05 PROBLEM — Z00.00 ENCOUNTER FOR PREVENTIVE HEALTH EXAMINATION: Status: ACTIVE | Noted: 2022-07-05

## 2022-07-08 PROBLEM — K21.9 GASTRO-ESOPHAGEAL REFLUX DISEASE WITHOUT ESOPHAGITIS: Chronic | Status: ACTIVE | Noted: 2021-07-28

## 2022-07-08 PROBLEM — I10 ESSENTIAL (PRIMARY) HYPERTENSION: Chronic | Status: ACTIVE | Noted: 2021-07-28

## 2022-07-19 ENCOUNTER — APPOINTMENT (OUTPATIENT)
Dept: OTOLARYNGOLOGY | Facility: CLINIC | Age: 57
End: 2022-07-19

## 2022-07-19 ENCOUNTER — RESULT REVIEW (OUTPATIENT)
Age: 57
End: 2022-07-19

## 2022-07-19 VITALS
OXYGEN SATURATION: 96 % | DIASTOLIC BLOOD PRESSURE: 79 MMHG | SYSTOLIC BLOOD PRESSURE: 131 MMHG | WEIGHT: 179 LBS | BODY MASS INDEX: 35.14 KG/M2 | HEIGHT: 60 IN | HEART RATE: 92 BPM

## 2022-07-19 DIAGNOSIS — E04.2 NONTOXIC MULTINODULAR GOITER: ICD-10-CM

## 2022-07-19 DIAGNOSIS — Z86.79 PERSONAL HISTORY OF OTHER DISEASES OF THE CIRCULATORY SYSTEM: ICD-10-CM

## 2022-07-19 DIAGNOSIS — Z83.49 FAMILY HISTORY OF OTHER ENDOCRINE, NUTRITIONAL AND METABOLIC DISEASES: ICD-10-CM

## 2022-07-19 DIAGNOSIS — Z78.9 OTHER SPECIFIED HEALTH STATUS: ICD-10-CM

## 2022-07-19 PROCEDURE — 99203 OFFICE O/P NEW LOW 30 MIN: CPT

## 2022-07-19 RX ORDER — HYDROCHLOROTHIAZIDE 12.5 MG/1
TABLET ORAL
Refills: 0 | Status: ACTIVE | COMMUNITY

## 2022-07-19 NOTE — HISTORY OF PRESENT ILLNESS
[None] : No associated symptoms are reported. [de-identified] : Ms. Ferraro is a 55 yo female who is being referred by Shea ASKEW . She reports she was sent for sonogram after TSH levels noted abnormal. \par 2/25/2022 sonogram showing bilateral thyroid nodules. right thyroid lobe mildly enlarged. right mid to upper lobe largest nodule measuring  3.3 cm nodule. No enlarged lymph nodes in the neck. \par 2/22/2022 TSH 0.36 no on thyroid med. \par Denies pain, dysphagia, dysphonia and or dyspnea .She sings. \par Unknowm family history of thyroid cancer. Martenal aunt has Hashimotos?\par Reports received 2nd booster COvid 19 vacc

## 2022-07-19 NOTE — CONSULT LETTER
[Dear  ___] : Dear ~GRISEL, [Consult Letter:] : I had the pleasure of evaluating your patient, [unfilled]. [Please see my note below.] : Please see my note below. [Consult Closing:] : Thank you very much for allowing me to participate in the care of this patient.  If you have any questions, please do not hesitate to contact me. [Sincerely,] : Sincerely, [FreeTextEntry2] : AZAR Saenz ( [place], [place]) [FreeTextEntry3] : Gabriel Robbins MD, FACS\par \par    [facility]\par Associate Chair\par    Department of Otolaryngology\par \par Professor\par Otolaryngology & Molecular Medicine\par [employer]\par

## 2022-08-17 ENCOUNTER — RESULT REVIEW (OUTPATIENT)
Age: 57
End: 2022-08-17

## 2022-08-17 ENCOUNTER — OUTPATIENT (OUTPATIENT)
Dept: OUTPATIENT SERVICES | Facility: HOSPITAL | Age: 57
LOS: 1 days | End: 2022-08-17
Payer: COMMERCIAL

## 2022-08-17 ENCOUNTER — APPOINTMENT (OUTPATIENT)
Dept: ULTRASOUND IMAGING | Facility: IMAGING CENTER | Age: 57
End: 2022-08-17

## 2022-08-17 DIAGNOSIS — Z98.84 BARIATRIC SURGERY STATUS: Chronic | ICD-10-CM

## 2022-08-17 DIAGNOSIS — Z90.49 ACQUIRED ABSENCE OF OTHER SPECIFIED PARTS OF DIGESTIVE TRACT: Chronic | ICD-10-CM

## 2022-08-17 DIAGNOSIS — Z00.8 ENCOUNTER FOR OTHER GENERAL EXAMINATION: ICD-10-CM

## 2022-08-17 DIAGNOSIS — Z96.651 PRESENCE OF RIGHT ARTIFICIAL KNEE JOINT: Chronic | ICD-10-CM

## 2022-08-17 PROCEDURE — 10005 FNA BX W/US GDN 1ST LES: CPT

## 2022-08-17 PROCEDURE — 88173 CYTOPATH EVAL FNA REPORT: CPT | Mod: 26

## 2022-08-17 PROCEDURE — 88172 CYTP DX EVAL FNA 1ST EA SITE: CPT

## 2022-08-17 PROCEDURE — 10006 FNA BX W/US GDN EA ADDL: CPT

## 2022-08-17 PROCEDURE — 88173 CYTOPATH EVAL FNA REPORT: CPT

## 2022-08-18 LAB — NON-GYNECOLOGICAL CYTOLOGY STUDY: SIGNIFICANT CHANGE UP

## 2022-08-24 ENCOUNTER — NON-APPOINTMENT (OUTPATIENT)
Age: 57
End: 2022-08-24

## 2023-06-01 NOTE — PHYSICAL THERAPY INITIAL EVALUATION ADULT - SIT-TO-STAND BALANCE
[facility] ENCOUN[facility]  HEMATOLOGY/ONCOLOGY OFFICE VISIT    CHIEF COMPLAINT:   Office Visit (Cbc, cmp, mg), Follow-up, Lung Cancer, and TREATMENT (Carbo/Alimta)      Oncologic History:   Pulmonary adenocarcinoma  Oncologic history:   Diagnosis: Pulmonary adenocarcinoma of right upper lobe, poorly differentiated  PDL1 CPS of 20%  MMRp    Date of diagnosis: 11/21/22    Stage at diagnosis:  Cancer Staging   Non-small cell cancer of right lung (CMD)  Staging form: Lung, AJCC 8th Edition  - Pathologic stage from 2/9/2023: Stage IIB (pT1b, pN1, cM0) - Signed by Gibson Melton MD on 6/26/2023      Previous therapy/significant history:   1. CT Chest PE with spiculated mass at the base of his right upper lobe.   2. Biopsy performed 11/21/2022 showing moderate-to-poorly differentiated adenocarcinoma, likely new primary.   3.  MRI Brain from 11/29/2022 showed no acute intracranial findings, there was suspicion of sinusitis vs mucocele. PET/CT from 12/7/2022 showed mild FDG avidity at biopsy-proven malignancy. There were other changes which were suggestive of inflammatory changes. No signs of distant metastasis were noted. Status post right upper and middle lobectomy with mediastinal LN dissection on 2/9/23. With final pathology confirming poorly-differentiated adenocarcinoma. Had LVI with 1 of 8 peribronchial LN being positive for metastatic adenocarcinoma. Pathologic stage of IIB (pT1b, pN1). Given the risk risk features as well as stage, adjuvant chemotherapy was offered.   4. Patient initiated Carboplatin, Pemetrexed, Pembrolizumab q 3 weeks on 3/23/2023. B12 injection given 3/16/2023.   5. CT Chest from 5/1/2023 noted a soft tissue density along sutur line of right upper medial hemithorax, difficult to assess whether atelectatic or recurrent neoplasm.   6. PET/CT from 5/10/23 shows moderate to marked focal uptake within right apical masslike opacity extending to right paratracheal region. Progressive or recurrent  disease versus infectious etiology.   7. Had bronchoscopic biopsy of the mass on 5/16/23. The sample contained very sparse cellularity.   8. After reviewing with radiologist, the plan is to pursue IR guided core needle biopsy of the mass which is scheduled for 9th of june.   9. He was admitted to hospital from 5/20/23 to 5/22/23 when he presented with COPD exacerbation along with concern for pneumonia and UTI. He had blood culture done which showed Gram positive branching bacill, cutibacterium acne. He was discharged on doxycycline which he has completed. He was also discharged on steroid which he has completed as well. He had CT chest done during the admission which showed increasing lobular soft tissue at the posterior aspect of the right hilum stable since the prior exam. New subpleural opacities in the right lung with increase in the tiny right pleural effusion or thickening.   10. He had CT angio of chest to evaluate for thoracic aneurysm done on 6/1/23. It showed the mass to be stable.   11.       Current therapy:   Initiated Carboplatin, Pemetrexed q 3 weeks x4 cycles on 3/23/2023     Intent of therapy: Curative     Performance status: ECOG PS of 1-2        History of Cecum/colon adenocarcinoma.  1. Status post right hemicolectomy Clinical stage III B with 3 positive nodes.   2. Status post 12 cycles of adjuvant FOLFOX. Finished in 10/16. EGD in 10/01/2019 and colonoscopy from 12/10/2019 were negative for any recurrent/metastatic disease   3. CT from ED visit on 6/9/20 did not show any metastatic disease, splenic lesions are benign \"cysts.\"  4. Currently on observation.       HISTORY OF PRESENT ILLNESS:   Mark Anthony Urrutia is a 70 year old male who presents for evaluation of history of colon cancer, newly diagnosed pulmonary adenocarcinoma. I have reviewed his chart. For additional history, please refer to EMR and prior documentation.    He was admitted to hospital from 5/20/23 to 5/22/23 when he presented with  COPD exacerbation along with concern for pneumonia and UTI. He had blood culture done which showed Gram positive branching bacill, cutibacterium acne. He was discharged on doxycycline which he has completed. He was also discharged on steroid which he has completed as well. He had CT chest done during the admission which showed increasing lobular soft tissue at the posterior aspect of the right hilum stable since the prior exam. New subpleural opacities in the right lung with increase in the tiny right pleural effusion or thickening.     HHe feels much better compared to when he was in the hospital but his breathing is not back to when it was prior to everything.     He had CT chest done during the admission which showed increasing lobular soft tissue at the posterior aspect of the right hilum stable since the prior exam. New subpleural opacities in the right lung with increase in the tiny right pleural effusion or thickening.   He had CT angio of chest to evaluate for thoracic aneurysm done yesterday. It showed the mass to be stable.     He denies any nausea currently.   Appetite is decent.   He complains of pain around the right jaw for few days now. It seems to be coming from the ear like earache. It has been improving by itself.   Denies any fever or chills.            PAST HISTORIES:  Past medical history, surgical history, family history, and social history were reviewed and updated.    MEDICATIONS / ALLERGIES:  Current Outpatient Medications   Medication Sig Dispense Refill   • lisinopril-hydroCHLOROthiazide (ZESTORETIC) 10-12.5 MG per tablet Take 1 tablet by mouth daily. 90 tablet 3   • diphenhydrAMINE (SOMINEX) 25 MG tablet Take 2 tablets by mouth 1 hour prior to CT Angiogram 2 tablet 0   • ZINC SULFATE PO Take 2 tablets by mouth daily.     • acetaminophen (TYLENOL) 325 MG tablet Take 325-650 mg by mouth every 6 hours as needed for Pain.     • ondansetron (ZOFRAN ODT) 8 MG disintegrating tablet Place 1  tablet onto the tongue every 8 hours as needed for Nausea. 30 tablet 1   • ferrous sulfate 325 (65 FE) MG tablet Take 1 tablet by mouth daily (with breakfast). Indications: Anemia From Inadequate Iron in the Body 30 tablet 3   • dexAMETHasone (DECADRON) 4 MG tablet Take 1 tablet by mouth 2 times daily. Take for 3 days with each chemotherapy cycle. Start the day before each pemetrexed treatment. 18 tablet 0   • folic acid (FOLATE) 1 MG tablet Take 1 tablet by mouth daily. Start 7 days prior to the first dose of pemetrexed. Continue until 21 days after last dose of pemetrexed. 30 tablet 11   • aspirin 81 MG EC tablet Take 81 mg by mouth daily. Indications: Disease involving Lipid Deposits in the Arteries, Joint Damage causing Pain and Loss of Function, Pain     • albuterol 108 (90 Base) MCG/ACT inhaler Inhale 2 puffs into the lungs every 4 hours as needed for Shortness of Breath or Wheezing. 18 g 2   • Ascorbic Acid (vitamin C) 1000 MG tablet Take 2,000 mg by mouth daily. Indications: Inadequate Vitamin C, support wound healing     • B Complex Vitamins (vitamin B complex) tablet Take 2 tablets by mouth daily. Indications: Vitamin Deficiency     • docusate sodium (COLACE) 100 MG capsule Take 100 mg by mouth in the morning and 100 mg in the evening. Indications: Constipation. Docusate Sodium 50 mg with Sennosides 8.6 mg     • gabapentin (NEURONTIN) 600 MG tablet Take 1,200 mg by mouth at bedtime. Indications: Neuropathic Pain     • melatonin 5 MG Take 25 mg by mouth nightly. Indications: Trouble Sleeping Patient states he takes 30mg of this nightly     • Cholecalciferol (VITAMIN D3) 5000 units capsule Take 3 capsules by mouth daily. Indications: Osteoporosis, Vitamin D Deficiency     • Multiple Vitamin (MULTI VITAMIN DAILY) Tab Take 2 tablets by mouth daily. Indications: Nutritional Support, Vitamin Deficiency       No current facility-administered medications for this visit.     ALLERGIES:   Allergen Reactions   •  Augmentin [Amoxicillin-Pot Clavulanate] CARDIAC DISTURBANCES and SHORTNESS OF BREATH   • Contrast Media WEAKNESS, MYALGIA and Tremors     Possible MR contrast allergic reaction admitted for OBS 11/29/22  Patient had also taken 10 mg Valium prior to scan  Symptoms significantly improved after receiving IV Solu-Medrol, Benadryl, and epinephrine            REVIEW OF SYSTEMS:   Comprehensive review of system completed as per nursing assessment and verified by me.  Please refer to the documentation from the nurse for the same day.    PHYSICAL EXAM:  Vital Signs:  Visit Vitals  /72 (BP Location: RLE - Right lower extremity, Patient Position: Sitting, Cuff Size: Regular)   Pulse 97   Temp 97 °F (36.1 °C) (Temporal)   Resp 20   Ht 5' 11\" (1.803 m)   Wt (!) 171 kg (377 lb)   SpO2 95%   BMI 52.58 kg/m²    Wt Readings from Last 10 Encounters:   06/01/23 (!) 171 kg (377 lb)   06/01/23 (!) 171.4 kg (377 lb 14.4 oz)   05/21/23 (!) 169.6 kg (373 lb 14.4 oz)   05/16/23 (!) 168.1 kg (370 lb 9.5 oz)   05/10/23 (!) 163.3 kg (360 lb)   05/04/23 (!) 167.8 kg (369 lb 14.9 oz)   04/22/23 (!) 156.5 kg (345 lb 0.3 oz)   04/20/23 (!) 165.6 kg (365 lb 3.1 oz)   04/13/23 (!) 165.1 kg (363 lb 15.7 oz)   04/07/23 (!) 164.2 kg (362 lb)        ECOG   ECOG Performance Status      General:  Well groomed, alert and oriented, well appearing, not in distress  HEENT: PERRLA, EOMI. Bilateral TM normal without any evidence of middle ear effusion or wax.   Neck: Supple, no mass   CV: Normal rate and regular rhythm. No Murmur, rub or gallop.    Chest: decreased breath sounds in bilateral lung fields.   Ext: No edema. No tenderness or deformities.   Lymph nodes:  No cervical, supraclavicular adenopathy.  Skin: normal color, no rashes or bruises or lesions.  CNS: cranial nerves 2 to 12 grossly intact, no focal neurologic deficit  Psych: affect appropriate; speech normal; cooperative         LABORATORY DATA:  Recent Results (from the past 336 hour(s))    Electrocardiogram 12-Lead    Collection Time: 05/20/23  7:26 PM   Result Value Ref Range    Systolic Blood Pressure 158     Diastolic Blood Pressure 77     Ventricular Rate EKG/Min (BPM) 88     Atrial Rate (BPM) 88     TN-Interval (MSEC) 172     QRS-Interval (MSEC) 104     QT-Interval (MSEC) 374     QTc 452     P Axis (Degrees) 57     R Axis (Degrees) 28     T Axis (Degrees) 40     REPORT TEXT       Sinus rhythm  with  premature atrial complexes  Otherwise normal ECG  When compared with ECG of  16-MAY-2023 11:30,  premature atrial complexes  are now  present  Confirmed by DR. MARIANN CLEANING [id number]),  Diana Barahona ([id number]) on 5/20/2023 8:52:35 PM     Comprehensive Metabolic Panel    Collection Time: 05/20/23  7:33 PM   Result Value Ref Range    Fasting Status      Sodium 138 135 - 145 mmol/L    Potassium 4.2 3.4 - 5.1 mmol/L    Chloride 108 97 - 110 mmol/L    Carbon Dioxide 23 21 - 32 mmol/L    Anion Gap 11 7 - 19 mmol/L    Glucose 95 70 - 99 mg/dL    BUN 22 (H) 6 - 20 mg/dL    Creatinine 1.08 0.67 - 1.17 mg/dL    Glomerular Filtration Rate 74 >=60    BUN/Cr 20 7 - 25    Calcium 9.6 8.4 - 10.2 mg/dL    Bilirubin, Total 0.4 0.2 - 1.0 mg/dL    GOT/AST 35 <=37 Units/L    GPT/ALT 44 <64 Units/L    Alkaline Phosphatase 124 (H) 45 - 117 Units/L    Albumin 3.1 (L) 3.6 - 5.1 g/dL    Protein, Total 7.7 6.4 - 8.2 g/dL    Globulin 4.6 (H) 2.0 - 4.0 g/dL    A/G Ratio 0.7 (L) 1.0 - 2.4   Procalcitonin    Collection Time: 05/20/23  7:33 PM   Result Value Ref Range    Procalcitonin <0.05 <=0.09 ng/mL   Prothrombin Time    Collection Time: 05/20/23  7:33 PM   Result Value Ref Range    Prothrombin Time 10.1 9.7 - 11.8 sec    INR 1.0     Partial Thromboplastin Time    Collection Time: 05/20/23  7:33 PM   Result Value Ref Range    PTT 27 22 - 30 sec   D Dimer, Quantitative    Collection Time: 05/20/23  7:33 PM   Result Value Ref Range    D Dimer, Quantitative 2.95 (H) <0.57 mg/L (FEU)   CBC with Automated Differential  (performable only)    Collection Time: 05/20/23  7:33 PM   Result Value Ref Range    WBC 5.3 4.2 - 11.0 K/mcL    RBC 3.21 (L) 4.50 - 5.90 mil/mcL    HGB 9.3 (L) 13.0 - 17.0 g/dL    HCT 28.7 (L) 39.0 - 51.0 %    MCV 89.4 78.0 - 100.0 fl    MCH 29.0 26.0 - 34.0 pg    MCHC 32.4 32.0 - 36.5 g/dL    RDW-CV 20.1 (H) 11.0 - 15.0 %    RDW-SD 61.4 (H) 39.0 - 50.0 fL     140 - 450 K/mcL    NRBC 0 <=0 /100 WBC    Neutrophil, Percent 50 %    Lymphocytes, Percent 32 %    Mono, Percent 16 %    Eosinophils, Percent 2 %    Basophils, Percent 0 %    Immature Granulocytes 0 %    Absolute Neutrophils 2.7 1.8 - 7.7 K/mcL    Absolute Lymphocytes 1.7 1.0 - 4.0 K/mcL    Absolute Monocytes 0.8 0.3 - 0.9 K/mcL    Absolute Eosinophils  0.1 0.0 - 0.5 K/mcL    Absolute Basophils 0.0 0.0 - 0.3 K/mcL    Absolute Immature Granulocytes 0.0 0.0 - 0.2 K/mcL   TROPONIN I, HIGH SENSITIVITY    Collection Time: 05/20/23  7:33 PM   Result Value Ref Range    Troponin I, High Sensitivity 8 <77 ng/L   NT proBNP    Collection Time: 05/20/23  7:33 PM   Result Value Ref Range    NT-proBNP 219 (H) <=125 pg/mL   LACTIC ACID VENOUS POINT OF CARE    Collection Time: 05/20/23  8:12 PM   Result Value Ref Range    LACTIC ACID, VENOUS - POINT OF CARE 1.5 <2.0 mmol/L   Blood Culture    Collection Time: 05/20/23  9:17 PM    Specimen: Blood, Peripheral   Result Value Ref Range    Culture, Blood or Bone Marrow Cutibacterium acnes (AA)     Gram Stain Gram positive branching bacilli. (AA)    Blood Culture    Collection Time: 05/20/23  9:18 PM    Specimen: Blood, Peripheral   Result Value Ref Range    Culture, Blood or Bone Marrow No Growth 5 Days.    Urinalysis & Reflex Microscopy With Culture If Indicated    Collection Time: 05/20/23 10:10 PM   Result Value Ref Range    COLOR, URINALYSIS Straw     APPEARANCE, URINALYSIS Clear     GLUCOSE, URINALYSIS Negative Negative mg/dL    BILIRUBIN, URINALYSIS Negative Negative    KETONES, URINALYSIS Negative Negative mg/dL     SPECIFIC GRAVITY, URINALYSIS 1.018 1.005 - 1.030    OCCULT BLOOD, URINALYSIS Negative Negative    PH, URINALYSIS 6.0 5.0 - 7.0    PROTEIN, URINALYSIS 30 (A) Negative mg/dL    UROBILINOGEN, URINALYSIS 0.2 0.2, 1.0 mg/dL    NITRITE, URINALYSIS Negative Negative    LEUKOCYTE ESTERASE, URINALYSIS Large (A) Negative    SQUAMOUS EPITHELIAL, URINALYSIS 1 to 5 None Seen, 1 to 5 /hpf    ERYTHROCYTES, URINALYSIS 6 to 10 (A) None Seen, 1 to 2 /hpf    LEUKOCYTES, URINALYSIS 26 to 100 (A) None Seen, 1 to 5 /hpf    BACTERIA, URINALYSIS Few (A) None Seen /hpf    HYALINE CASTS, URINALYSIS None Seen None Seen, 1 to 5 /lpf    MUCUS Present    Urine, Bacterial Culture    Collection Time: 05/20/23 10:10 PM    Specimen: Urine clean catch   Result Value Ref Range    Urine, Bacterial Culture       60,000 - 100,000 CFU/mL, Multiple organisms isolated with no predominant type, consistent with contamination. Consider recollection.   Rapid SARS-CoV-2 by PCR    Collection Time: 05/20/23 11:58 PM    Specimen: Nasal, Mid-turbinate; Swab   Result Value Ref Range    Rapid SARS-COV-2 by PCR Not Detected Not Detected / Detected / Presumptive Positive / Inhibitors present    Isolation Guidelines      Procedural Comment     Comprehensive Metabolic Panel    Collection Time: 05/21/23  3:16 AM   Result Value Ref Range    Fasting Status      Sodium 140 135 - 145 mmol/L    Potassium 3.7 3.4 - 5.1 mmol/L    Chloride 109 97 - 110 mmol/L    Carbon Dioxide 26 21 - 32 mmol/L    Anion Gap 9 7 - 19 mmol/L    Glucose 188 (H) 70 - 99 mg/dL    BUN 21 (H) 6 - 20 mg/dL    Creatinine 1.21 (H) 0.67 - 1.17 mg/dL    Glomerular Filtration Rate 65 >=60    BUN/Cr 17 7 - 25    Calcium 9.0 8.4 - 10.2 mg/dL    Bilirubin, Total 0.3 0.2 - 1.0 mg/dL    GOT/AST 31 <=37 Units/L    GPT/ALT 40 <64 Units/L    Alkaline Phosphatase 119 (H) 45 - 117 Units/L    Albumin 2.9 (L) 3.6 - 5.1 g/dL    Protein, Total 7.3 6.4 - 8.2 g/dL    Globulin 4.4 (H) 2.0 - 4.0 g/dL    A/G Ratio 0.7 (L) 1.0 -  2.4   Magnesium    Collection Time: 05/21/23  3:16 AM   Result Value Ref Range    Magnesium 2.1 1.7 - 2.4 mg/dL   Phosphorus    Collection Time: 05/21/23  3:16 AM   Result Value Ref Range    Phosphorus 3.0 2.4 - 4.7 mg/dL   Procalcitonin    Collection Time: 05/21/23  3:16 AM   Result Value Ref Range    Procalcitonin <0.05 <=0.09 ng/mL   NT proBNP    Collection Time: 05/21/23  3:16 AM   Result Value Ref Range    NT-proBNP 184 (H) <=125 pg/mL   CBC No Differential    Collection Time: 05/21/23  3:16 AM   Result Value Ref Range    WBC 5.3 4.2 - 11.0 K/mcL    RBC 2.47 (L) 4.50 - 5.90 mil/mcL    HGB 7.3 (L) 13.0 - 17.0 g/dL    HCT 22.5 (L) 39.0 - 51.0 %    MCV 91.1 78.0 - 100.0 fl    MCH 29.6 26.0 - 34.0 pg    MCHC 32.4 32.0 - 36.5 g/dL     140 - 450 K/mcL    RDW-CV 20.1 (H) 11.0 - 15.0 %    RDW-SD 61.0 (H) 39.0 - 50.0 fL    NRBC 0 <=0 /100 WBC   Lactic Acid Venous With Reflex    Collection Time: 05/21/23  3:16 AM   Result Value Ref Range    Lactate, Venous 1.7 0.0 - 2.0 mmol/L   Blood Gas, Venous    Collection Time: 05/21/23  3:16 AM   Result Value Ref Range    pH, Venous 7.43 7.35 - 7.45 Units    pCO2, Venous 38 (L) 41 - 54 mm Hg    pO2, Venous 51 (H) 35 - 42 mm Hg    HCO3, Venous 25 22 - 28 mmol/L    Base Excess/ Deficit, Venous 1 -2 - 2 mmol/L    O2 Saturation, Venous 84 (H) 60 - 80 %    Oxyhemoglobin, Venous 82 (H) 60 - 80 %    Hemoglobin, Blood Gas 11.5 (L) 13.0 - 17.0 g/dL   Creatinine    Collection Time: 05/21/23  3:17 AM   Result Value Ref Range    Creatinine 1.20 (H) 0.67 - 1.17 mg/dL    Glomerular Filtration Rate 65 >=60   Basic Metabolic Panel    Collection Time: 05/22/23  9:08 AM   Result Value Ref Range    Fasting Status      Sodium 139 135 - 145 mmol/L    Potassium 3.6 3.4 - 5.1 mmol/L    Chloride 108 97 - 110 mmol/L    Carbon Dioxide 22 21 - 32 mmol/L    Anion Gap 13 7 - 19 mmol/L    Glucose 148 (H) 70 - 99 mg/dL    BUN 23 (H) 6 - 20 mg/dL    Creatinine 1.04 0.67 - 1.17 mg/dL    Glomerular  Filtration Rate 78 >=60    BUN/Cr 22 7 - 25    Calcium 9.0 8.4 - 10.2 mg/dL   CBC with Automated Differential (performable only)    Collection Time: 05/22/23  9:08 AM   Result Value Ref Range    WBC 5.3 4.2 - 11.0 K/mcL    RBC 3.00 (L) 4.50 - 5.90 mil/mcL    HGB 8.7 (L) 13.0 - 17.0 g/dL    HCT 27.5 (L) 39.0 - 51.0 %    MCV 91.7 78.0 - 100.0 fl    MCH 29.0 26.0 - 34.0 pg    MCHC 31.6 (L) 32.0 - 36.5 g/dL    RDW-CV 21.0 (H) 11.0 - 15.0 %    RDW-SD 65.2 (H) 39.0 - 50.0 fL     140 - 450 K/mcL    NRBC 0 <=0 /100 WBC    Neutrophil, Percent 53 %    Lymphocytes, Percent 32 %    Mono, Percent 13 %    Eosinophils, Percent 1 %    Basophils, Percent 0 %    Immature Granulocytes 1 %    Absolute Neutrophils 2.8 1.8 - 7.7 K/mcL    Absolute Lymphocytes 1.7 1.0 - 4.0 K/mcL    Absolute Monocytes 0.7 0.3 - 0.9 K/mcL    Absolute Eosinophils  0.1 0.0 - 0.5 K/mcL    Absolute Basophils 0.0 0.0 - 0.3 K/mcL    Absolute Immature Granulocytes 0.0 0.0 - 0.2 K/mcL   C Reactive Protein    Collection Time: 05/22/23  9:08 AM   Result Value Ref Range    C-Reactive Protein 1.0 <=1.0 mg/dL   Albumin    Collection Time: 05/22/23  9:08 AM   Result Value Ref Range    Albumin 2.9 (L) 3.6 - 5.1 g/dL   ECHO Limited    Collection Time: 05/22/23 12:23 PM   Result Value Ref Range    MV Peak E Velocity 0.8 m/s    MV Peak A Velocity 0.6 m/s    E Wave Decelaration Time 0.16 s    MV E Tissue Malik Med 0.0 m/s    MV E Tissue Malik Lat 0.1 m/s    TV Estimated Right Arterial Pressure 3 mmHg    MV E Wave Malik/E Tissue Malik Med 11.821 unitless   Comprehensive Metabolic Panel    Collection Time: 06/01/23 12:20 PM   Result Value Ref Range    Fasting Status      Sodium 140 135 - 145 mmol/L    Potassium 4.2 3.4 - 5.1 mmol/L    Chloride 108 97 - 110 mmol/L    Carbon Dioxide 24 21 - 32 mmol/L    Anion Gap 12 7 - 19 mmol/L    Glucose 157 (H) 70 - 99 mg/dL    BUN 19 6 - 20 mg/dL    Creatinine 0.96 0.67 - 1.17 mg/dL    Glomerular Filtration Rate 86 >=60    BUN/Cr 20 7 - 25     Calcium 9.3 8.4 - 10.2 mg/dL    Bilirubin, Total 0.4 0.2 - 1.0 mg/dL    GOT/AST 24 <=37 Units/L    GPT/ALT 34 <64 Units/L    Alkaline Phosphatase 101 45 - 117 Units/L    Albumin 3.2 (L) 3.6 - 5.1 g/dL    Protein, Total 7.5 6.4 - 8.2 g/dL    Globulin 4.3 (H) 2.0 - 4.0 g/dL    A/G Ratio 0.7 (L) 1.0 - 2.4   Magnesium    Collection Time: 06/01/23 12:20 PM   Result Value Ref Range    Magnesium 2.1 1.7 - 2.4 mg/dL   CBC with Automated Differential (performable only)    Collection Time: 06/01/23 12:20 PM   Result Value Ref Range    WBC 6.7 4.2 - 11.0 K/mcL    RBC 3.45 (L) 4.50 - 5.90 mil/mcL    HGB 9.8 (L) 13.0 - 17.0 g/dL    HCT 32.2 (L) 39.0 - 51.0 %    MCV 93.3 78.0 - 100.0 fl    MCH 28.4 26.0 - 34.0 pg    MCHC 30.4 (L) 32.0 - 36.5 g/dL    RDW-CV 21.3 (H) 11.0 - 15.0 %    RDW-SD 71.3 (H) 39.0 - 50.0 fL     140 - 450 K/mcL    NRBC 0 <=0 /100 WBC    Neutrophil, Percent 77 %    Lymphocytes, Percent 17 %    Mono, Percent 5 %    Eosinophils, Percent 0 %    Basophils, Percent 0 %    Immature Granulocytes 1 %    Analyzer ANC 5.2 1.8 - 7.7 K/mcl    Absolute Neutrophils 5.2 1.8 - 7.7 K/mcL    Absolute Lymphocytes 1.1 1.0 - 4.0 K/mcL    Absolute Monocytes 0.4 0.3 - 0.9 K/mcL    Absolute Eosinophils  0.0 0.0 - 0.5 K/mcL    Absolute Basophils 0.0 0.0 - 0.3 K/mcL    Absolute Immature Granulocytes 0.1 0.0 - 0.2 K/mcL   C Reactive Protein    Collection Time: 06/01/23 12:20 PM   Result Value Ref Range    C-Reactive Protein 0.5 <=1.0 mg/dL         PATHOLOGY:   None for review    IMAGING STUDIES:   CT angio chest 5/31/23  1. Aneurysmal dilatation of the ascending aorta, most pronounced at the  sinuses of Valsalva where the diameter is 5.2 cm. In the mid ascending  segment, the diameter is 4.0 cm.  2. Conventional origins of the coronary arteries. A very short left main is  present before bifurcating into the left circumflex and anterior descending  arteries.  3. Enlarged main pulmonary artery, 4.0 cm, suggesting pulmonary  arterial  hypertension.  4. Compared with the CT scan of May 21, 2023, there is stable soft tissue  around the right upper lung and right hilar staple line, which is again  concerning for malignancy.  5. Unchanged appearance of a 5 cm hypodense splenic lesion.      ASSESSMENT:   1. Pulmonary adenocarcinoma   CT Chest PE with spiculated mass at the base of his right upper lobe.   Biopsy performed 11/21/2022 showing moderate-to-poorly differentiated adenocarcinoma, likely new primary.   MRI Brain from 11/29/2022 showed no acute intracranial findings, there was suspicion of sinusitis vs mucocele. PET/CT from 12/7/2022 showed mild FDG avidity at biopsy-proven malignancy. There were other changes which were suggestive of inflammatory changes. No signs of distant metastasis were noted.   Right upper and middle lobectomy 2/9/2023 confirming poorly-differentiated adenocarcinoma.  Patient initiated Carboplatin, Pemetrexed, Pembrolizumab q 3 weeks on 3/23/2023. B12 injection given 3/16/2023.   CT Chest from 5/1/2023 noted a soft tissue density along sutur line of right upper medial hemithorax, difficult to assess whether atelectatic or recurrent neoplasm.   PET/CT from 5/10/23 shows moderate to marked focal uptake within right apical masslike opacity extending to right paratracheal region. Progressive or recurrent disease versus infectious etiology  Had bronchoscopic biopsy of the mass on 5/16/23. The sample contained very sparse cellularity.   After reviewing with radiologist, the plan is to pursue IR guided core needle biopsy of the mass which is scheduled for 9th.     Reviewed labs from today which shows improving hemoglobin. Rest of the blood counts are within acceptable range. Reviewed CMP which shows normal potassium and creatinine back to normal.   Reviewed with him that given the growing nature of the mass, this could be malignant lesion but could be benign as well. Will have to wait for the biopsy result to come  back. As he is already delayed by a week for chemotherapy due to hospitalization, reviewed with him that at this time benefit may be more than risk for doing chemotherapy and completing his adjuvant therapy. Reviewed with him that if this mass was to come back as malignant lesion then he will not benefit any from this treatment done today.   He wants to complete the planned adjuvant therapy for now and see what the biopsy shows. I think this is the most reasonable option as well.   Will proceed with treatment with carboplatin and pemetrexed as planned. Will give him vitamin B12 today.   Will plan to see him after biopsy to discuss result of the biopsy.     Again advised maintaining a high-protein diet throughout treatment course as well as adequate fluid intake.  Reviewed signs and symptoms which to monitor for and to contact the clinic if any issues arise.       2. Cecum/colon adenocarcinoma   Status post right hemicolectomy with Clinical stage III B with 3 positive nodes.   Status post 12 cycles of adjuvant FOLFOX. Finished in 10/2016.  EGD in 10/01/2019 and colonoscopy from 12/10/2019 were negative for any recurrent/metastatic disease.   CT from ED visit on 6/9/20 did not show any metastatic disease, splenic lesions are benign \"cysts.\"    Continue on observation.     3. DJD hip and DDD back, with pain that limits his activities.  Right hip worse than left. Patient is following with Orthopedic specialists, with reconstructive surgery on hold until he loses weight.   Manages pain with ibuprofen nightly to help with sleep.    4. Morbid obesity   Have counseled about healthy life style and weight loss.     5. Possible mucocele secondary to pansinusitis  Identified on MRI Brain.   Following with ENT. Was referred to Dr. Daniel Davenport at [facility] for further evaluation and treatment. Will plan to send him for referral once he completes adjuvant chemotherapy    6. Abnormal activity of skin on PET/CT  Secondary to  Hidradenitis.   No evidence of secondary infection  Will continue to monitor closely due to risk of infection while on chemotherapy    7. Hypokalemia   Patient has been taking KLOR 20 mEq BID. Continue with the same.     8. COPD, with recent exacerbation  Respiratory status improving. No wheeze on exam today.   Will continue to monitor.          Rationale for proposed plan of care discussed. Patient stated understanding and agreement. Questions were answered. Follow-up was arranged.     Follow up:   Vitamin B12 injection today  Return with me in 2 weeks with CBC, CMP and mag prior with possible iv fluids.         There are no Patient Instructions on file for this visit.      The patient indicated understanding of the diagnosis and agreed with the plan of care.    A copy of this note was sent to the requesting provider.    Gibson Melton MD  Department of Hematology/Oncology   6/1/2023       RW/fair balance